# Patient Record
Sex: FEMALE | Race: WHITE | URBAN - METROPOLITAN AREA
[De-identification: names, ages, dates, MRNs, and addresses within clinical notes are randomized per-mention and may not be internally consistent; named-entity substitution may affect disease eponyms.]

---
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
## 2022-07-07 ENCOUNTER — OFFICE VISIT (OUTPATIENT)
Dept: URBAN - METROPOLITAN AREA CLINIC 107 | Facility: CLINIC | Age: 22
End: 2022-07-07
Payer: SELF-PAY

## 2022-07-07 ENCOUNTER — DASHBOARD ENCOUNTERS (OUTPATIENT)
Age: 22
End: 2022-07-07

## 2022-07-07 ENCOUNTER — WEB ENCOUNTER (OUTPATIENT)
Dept: URBAN - METROPOLITAN AREA CLINIC 107 | Facility: CLINIC | Age: 22
End: 2022-07-07

## 2022-07-07 VITALS
HEART RATE: 77 BPM | BODY MASS INDEX: 27.97 KG/M2 | TEMPERATURE: 98.2 F | SYSTOLIC BLOOD PRESSURE: 120 MMHG | WEIGHT: 152 LBS | HEIGHT: 62 IN | DIASTOLIC BLOOD PRESSURE: 79 MMHG

## 2022-07-07 DIAGNOSIS — K92.1 MELENA: ICD-10-CM

## 2022-07-07 DIAGNOSIS — R10.13 EPIGASTRIC ABDOMINAL PAIN: ICD-10-CM

## 2022-07-07 DIAGNOSIS — R10.11 RIGHT UPPER QUADRANT ABDOMINAL PAIN: ICD-10-CM

## 2022-07-07 PROCEDURE — 99204 OFFICE O/P NEW MOD 45 MIN: CPT | Performed by: INTERNAL MEDICINE

## 2022-07-07 RX ORDER — PANTOPRAZOLE SODIUM 40 MG/1
1 TABLET TABLET, DELAYED RELEASE ORAL ONCE A DAY
Qty: 90 TABLET | Refills: 3 | OUTPATIENT
Start: 2022-07-07

## 2022-07-07 NOTE — HPI-TODAY'S VISIT:
23yo female presenting for evaluation of abdominal pain. Last month, she experienced an exacerbation of diffuse abdominal pain and blood in the stool. She was seen in the ED multiple times and ultimately diagnosed with a hemorrhagic ovarian cyst. While her lower abdominal pain has subsided, she continues to experience intermittent burning epigastric and right upper quadrant abdominal pain. This is often worsened following meals. She has infrequent heartburn and indigestion which does not require intervention. She recently experienced an isolated episode of nausea and vomiting, but this has not recurred. She complains of black stools around the time of symptom onset, which have subsided. She is having a daily bowel movement without red blood per rectum. She denies NSAID use.

## 2022-07-07 NOTE — HPI-OTHER HISTORIES
Labs 6/13/22: H/H 12.9/37.7, MCV 92, Plt 246, WBC 6.9. CMP unremarkable with normal LFTs. Normal lipase. Pelvic US 6/9/22: left ovary contains a 2.6x2.0cm cyst with heterogenous internal contents, likely hemorrhagic cyst. Heterogenous fluid throughout the pelvis concerning for hemorrhage as noted on prior CT. CT a/p without contrast 6/8/22: moderate amount of hemorrhagic fluid within the pelvis, nonobstructing right renal calculus. Normal gallbladder.

## 2022-09-07 ENCOUNTER — OFFICE VISIT (OUTPATIENT)
Dept: URBAN - METROPOLITAN AREA CLINIC 107 | Facility: CLINIC | Age: 22
End: 2022-09-07